# Patient Record
Sex: FEMALE | Race: ASIAN | Employment: FULL TIME | URBAN - METROPOLITAN AREA
[De-identification: names, ages, dates, MRNs, and addresses within clinical notes are randomized per-mention and may not be internally consistent; named-entity substitution may affect disease eponyms.]

---

## 2019-08-22 PROCEDURE — 88175 CYTOPATH C/V AUTO FLUID REDO: CPT | Performed by: OBSTETRICS & GYNECOLOGY

## 2019-08-22 PROCEDURE — 87624 HPV HI-RISK TYP POOLED RSLT: CPT | Performed by: OBSTETRICS & GYNECOLOGY

## 2022-03-28 ENCOUNTER — HOSPITAL ENCOUNTER (OUTPATIENT)
Age: 47
Discharge: HOME | End: 2022-03-28
Payer: COMMERCIAL

## 2022-03-28 VITALS
OXYGEN SATURATION: 100 % | DIASTOLIC BLOOD PRESSURE: 61 MMHG | HEIGHT: 64 IN | HEART RATE: 79 BPM | TEMPERATURE: 98 F | WEIGHT: 165 LBS | RESPIRATION RATE: 16 BRPM | BODY MASS INDEX: 28.17 KG/M2 | SYSTOLIC BLOOD PRESSURE: 118 MMHG

## 2022-03-28 DIAGNOSIS — Z20.822 ENCOUNTER FOR LABORATORY TESTING FOR COVID-19 VIRUS: Primary | ICD-10-CM

## 2022-03-28 DIAGNOSIS — U07.1 COVID-19: ICD-10-CM

## 2022-03-28 LAB — SARS-COV-2 RNA RESP QL NAA+PROBE: DETECTED

## 2022-03-28 PROCEDURE — U0002 COVID-19 LAB TEST NON-CDC: HCPCS

## 2022-03-28 PROCEDURE — 99213 OFFICE O/P EST LOW 20 MIN: CPT

## 2022-03-28 RX ORDER — LEVOCETIRIZINE DIHYDROCHLORIDE 5 MG/1
5 TABLET, FILM COATED ORAL EVERY EVENING
COMMUNITY

## 2024-04-01 ENCOUNTER — HOSPITAL ENCOUNTER (OUTPATIENT)
Age: 49
Discharge: HOME | End: 2024-04-01
Payer: COMMERCIAL

## 2024-04-01 VITALS
TEMPERATURE: 97 F | DIASTOLIC BLOOD PRESSURE: 47 MMHG | OXYGEN SATURATION: 99 % | RESPIRATION RATE: 20 BRPM | HEART RATE: 66 BPM | SYSTOLIC BLOOD PRESSURE: 124 MMHG

## 2024-04-01 DIAGNOSIS — B34.9 VIRAL ILLNESS: Primary | ICD-10-CM

## 2024-04-01 PROCEDURE — 99213 OFFICE O/P EST LOW 20 MIN: CPT | Performed by: NURSE PRACTITIONER

## 2024-04-01 NOTE — ED PROVIDER NOTES
Patient Seen in: [facility]      History     Chief Complaint   Patient presents with    Runny Nose     Stated Complaint: Congestion    Subjective:   HPI    48-year-old female presents to immediate care with complaints of congestion x 2 weeks.  She has been afebrile.  She states she takes daily Xyzal.  She denies other complaints.    Objective:   Past Medical History:   Diagnosis Date    Dermatographia     Fibroadenoma of both breasts     Headache disorder     menstrual    Varicella               Past Surgical History:   Procedure Laterality Date    BREAST BIOPSY      BREAST LUMPECTOMY       DELIVERY ONLY      EXCISIONAL BIOPSY LEFT      Fibroadenoma removed    EXCISIONAL BIOSPY RIGHT  2004    fibroadenoma removed    NEEDLE BIOPSY LEFT  2017    fibroadenoma    TONSILLECTOMY      TUBAL LIGATION                  Social History     Socioeconomic History    Marital status:    Tobacco Use    Smoking status: Never    Smokeless tobacco: Never   Vaping Use    Vaping Use: Never used   Substance and Sexual Activity    Alcohol use: Yes     Comment: social    Drug use: Never    Sexual activity: Yes     Partners: Male     Birth control/protection: Tubal Ligation     Comment: 20 current              Review of Systems    Positive for stated complaint: Congestion  Other systems are as noted in HPI.  Constitutional and vital signs reviewed.      All other systems reviewed and negative except as noted above.    Physical Exam     ED Triage Vitals [24 1018]   /47   Pulse 66   Resp 20   Temp 97.3 °F (36.3 °C)   Temp src Temporal   SpO2 99 %   O2 Device None (Room air)       Current:/47   Pulse 66   Temp 97.3 °F (36.3 °C) (Temporal)   Resp 20   LMP  (LMP Unknown)   SpO2 99%         Physical Exam  Vitals reviewed.   Constitutional:       General: She is not in acute distress.  HENT:      Right Ear: Tympanic membrane, ear canal and external ear normal.      Left Ear: Tympanic  membrane, ear canal and external ear normal.      Nose: Congestion present. No rhinorrhea.      Mouth/Throat:      Mouth: Mucous membranes are moist.      Pharynx: No oropharyngeal exudate or posterior oropharyngeal erythema.   Cardiovascular:      Rate and Rhythm: Normal rate and regular rhythm.   Pulmonary:      Effort: Pulmonary effort is normal.      Breath sounds: Normal breath sounds.   Musculoskeletal:         General: Normal range of motion.   Skin:     General: Skin is warm and dry.   Neurological:      General: No focal deficit present.      Mental Status: She is alert and oriented to person, place, and time.   Psychiatric:         Mood and Affect: Mood normal.         Behavior: Behavior normal.               ED Course   Labs Reviewed - No data to display                   MDM                                         Medical Decision Making  48-year-old with nasal congestion x 2 weeks.  Differential diagnosis includes allergic rhinitis, viral rhinitis, viral upper respiratory infection.  On exam there is no erythema of either TM.  Lungs are clear to auscultate.  There is no active nasal drainage.  This could be combination of allergic rhinitis and viral rhinitis.  Patient takes daily Xyzal and she was instructed to continue.  She may also add Flonase nasal spray.  Patient declined treatment with steroids.    Risk  OTC drugs.  Prescription drug management.        Disposition and Plan     Clinical Impression:  No diagnosis found.     Disposition:  Discharge  4/1/2024 10:36 am    Follow-up:  No follow-up provider specified.        Medications Prescribed:  Discharge Medication List as of 4/1/2024 10:37 AM

## 2024-04-28 ENCOUNTER — HOSPITAL ENCOUNTER (OUTPATIENT)
Age: 49
Discharge: HOME | End: 2024-04-28
Payer: COMMERCIAL

## 2024-04-28 VITALS
SYSTOLIC BLOOD PRESSURE: 114 MMHG | RESPIRATION RATE: 18 BRPM | OXYGEN SATURATION: 98 % | TEMPERATURE: 97 F | DIASTOLIC BLOOD PRESSURE: 59 MMHG | HEART RATE: 70 BPM

## 2024-04-28 DIAGNOSIS — J06.9 VIRAL URI WITH COUGH: Primary | ICD-10-CM

## 2024-04-28 LAB — S PYO AG THROAT QL: NEGATIVE

## 2024-04-28 PROCEDURE — 87880 STREP A ASSAY W/OPTIC: CPT | Performed by: PHYSICIAN ASSISTANT

## 2024-04-28 PROCEDURE — 99213 OFFICE O/P EST LOW 20 MIN: CPT | Performed by: PHYSICIAN ASSISTANT

## 2024-04-28 NOTE — ED INITIAL ASSESSMENT (HPI)
Congestion , dry cough, runny nose for over a week.  sore throat started 2 days ago, ears \"feel funny.\" Denies fevers or chills. Son sick at home with same symptoms

## 2024-04-28 NOTE — ED PROVIDER NOTES
Patient Seen in: [facility]      History     Chief Complaint   Patient presents with    Sore Throat     Stated Complaint: Sore throat;Runny nose;Fatigue;Cough    Subjective:   HPI    Patient is a 48-year-old female that presents to immediate care due to sinus congestion x 1 week.  Associated symptoms include cough, sore throat.  States sore throat developed over the past few days.  Has been using saline nasal spray, Xyzal at home with mild relief.  Denying chest pain shortness of breath wheezing fever.  Positive sick contacts at home with similar symptoms.    Objective:   Past Medical History:    Dermatographia    Fibroadenoma of both breasts    Headache disorder    menstrual    Varicella              Past Surgical History:   Procedure Laterality Date    Breast biopsy      Breast lumpectomy       delivery only      Excisional biopsy left      Fibroadenoma removed    Excisional biospy right  2004    fibroadenoma removed    Needle biopsy left  2017    fibroadenoma    Tonsillectomy      Tubal ligation                  Social History     Socioeconomic History    Marital status:    Tobacco Use    Smoking status: Never    Smokeless tobacco: Never   Vaping Use    Vaping status: Never Used   Substance and Sexual Activity    Alcohol use: Yes     Comment: social    Drug use: Never    Sexual activity: Yes     Partners: Male     Birth control/protection: Tubal Ligation     Comment: 20 current     Social Determinants of Health     Physical Activity: Sufficiently Active (2024)    Received from [facility]    Physical Activity     On average, how many days per week do you engage in moderate to strenuous exercise (like a brisk walk)?: 4 days     On average, how many minutes do you engage in exercise at this level?: 40 min    Received from [facility]    [facility]              Review of Systems    Positive for stated complaint: Sore throat;Runny  nose;Fatigue;Cough  Other systems are as noted in HPI.  Constitutional and vital signs reviewed.      All other systems reviewed and negative except as noted above.    Physical Exam     ED Triage Vitals [04/28/24 0845]   /59   Pulse 70   Resp 18   Temp 97.3 °F (36.3 °C)   Temp src Temporal   SpO2 98 %   O2 Device None (Room air)       Current:/59   Pulse 70   Temp 97.3 °F (36.3 °C) (Temporal)   Resp 18   LMP 04/18/2024   SpO2 98%         Physical Exam    Vital signs reviewed. Nursing note reviewed.  Constitutional: Well-developed. Well-nourished. In no acute distress  HENT: Mucous membranes moist. TMs intact bilaterally. No trismus. Uvula midline. Mild posterior pharynx erythema.  No petechiae, exudates, or posterior pharynx edema.  EYES: No scleral icterus or conjunctival injection.  NECK: Full ROM. Supple.   CARDIAC: Normal rate. Normal S1/ S2. 2+ distal pulses. No edema  PULM/CHEST: Clear to auscultation bilaterally. No wheezes  Extremities: Full ROM  NEURO: Awake, alert, following commands, moving extremities, answering questions.   SKIN: Warm and dry. No rash or lesions.  PSYCH: Normal judgment. Normal affect.        ED Course     Labs Reviewed   POCT RAPID STREP - Normal                      MDM      Patient is a 48-year-old female that presents to immediate care due to sinus congestion cough sore throat x 1 week.  Patient arrives with stable vitals sitting comfortably.  Physical exam showing lungs clear to auscultation TMs intact bilaterally.  Most likely acute URI, viral illness, acute sinusitis.  Less likely strep pharyngitis as patient had rapid negative test today in immediate care.  Discussed use of antihistamine such as Claritin Zyrtec Benadryl along with Sudafed decongestants.  History given by patient.  Patient agreeable to plan.                                   Medical Decision Making      Disposition and Plan     Clinical Impression:  1. Viral URI with cough          Disposition:  Discharge  4/28/2024  9:05 am    Follow-up:  Luiz Painter  [address] [id number] [phone]    Call             Medications Prescribed:  Discharge Medication List as of 4/28/2024  9:07 AM